# Patient Record
Sex: FEMALE | Race: BLACK OR AFRICAN AMERICAN | Employment: FULL TIME | URBAN - METROPOLITAN AREA
[De-identification: names, ages, dates, MRNs, and addresses within clinical notes are randomized per-mention and may not be internally consistent; named-entity substitution may affect disease eponyms.]

---

## 2018-08-05 ENCOUNTER — HOSPITAL ENCOUNTER (EMERGENCY)
Age: 28
Discharge: HOME | End: 2018-08-05
Attending: EMERGENCY MEDICINE
Payer: COMMERCIAL

## 2018-08-05 ENCOUNTER — APPOINTMENT (OUTPATIENT)
Dept: GENERAL RADIOLOGY | Age: 28
End: 2018-08-05
Payer: COMMERCIAL

## 2018-08-05 VITALS
HEART RATE: 81 BPM | DIASTOLIC BLOOD PRESSURE: 59 MMHG | BODY MASS INDEX: 37.21 KG/M2 | HEIGHT: 63 IN | RESPIRATION RATE: 18 BRPM | OXYGEN SATURATION: 100 % | WEIGHT: 210 LBS | SYSTOLIC BLOOD PRESSURE: 113 MMHG | TEMPERATURE: 98 F

## 2018-08-05 DIAGNOSIS — M25.562 ACUTE PAIN OF LEFT KNEE: ICD-10-CM

## 2018-08-05 DIAGNOSIS — M79.605 LEFT LEG PAIN: Primary | ICD-10-CM

## 2018-08-05 PROCEDURE — 99283 EMERGENCY DEPT VISIT LOW MDM: CPT

## 2018-08-05 PROCEDURE — 73564 X-RAY EXAM KNEE 4 OR MORE: CPT

## 2018-08-05 RX ORDER — IBUPROFEN 600 MG/1
600 TABLET ORAL EVERY 6 HOURS PRN
Qty: 40 TABLET | Refills: 0 | Status: SHIPPED | OUTPATIENT
Start: 2018-08-05

## 2018-08-05 ASSESSMENT — PAIN DESCRIPTION - LOCATION: LOCATION: KNEE;LEG

## 2018-08-05 ASSESSMENT — PAIN SCALES - GENERAL: PAINLEVEL_OUTOF10: 10

## 2018-08-05 ASSESSMENT — PAIN DESCRIPTION - ORIENTATION: ORIENTATION: LEFT;LOWER;POSTERIOR

## 2018-08-05 ASSESSMENT — PAIN DESCRIPTION - DESCRIPTORS: DESCRIPTORS: ACHING;THROBBING;SHARP;STABBING

## 2018-08-05 NOTE — ED PROVIDER NOTES
Mild distress   Head: Normocephalic  Eyes: No scleral icterus  ENT: No dry mucus membranes  Neck: Supple  Lungs: Clear to ascultation bilaterally, no wheeze, no rales, no rhonchi  Cardiac: Regular rate and rhythm, S1/S2, no murmurs  Abdominal: Soft, nondistended, nontender, no rebound, no guarding. Extremities: Left knee tenderness to palpation along the lateral aspects, calf abrasions to the left lateral   Rectal: Deferred  Genitourinary: Deferred  Skin: No rash  Neuro: AOx4, no decreased LOC  Psych: No anxiety  Perfusion: Warm x 4      MEDICAL DECISION MAKING:   [facility]  X-ray left knee shows no acute processes per the radiologist.  Patient was placed in a knee immobilizer and crutches. I reevaluated him after the immobilizer was placed and it is neurovascularly intact. Patient was given a prescription for Vicoprofen. Patient will follow up with orthopedics. Procedures    DIAGNOSTIC RESULTS   EKG: All EKG's are interpreted by the Emergency Department Physician who either signs or Co-signs this chart in the absence of a cardiologist.      RADIOLOGY:All plain film, CT, MRI, and formal ultrasound images (except ED bedside ultrasound) are read by the radiologist, see reports below, unless otherwise noted in [facility] or here. XR KNEE LEFT (MIN 4 VIEWS)   Final Result   Negative left knee radiographs. LABS: All lab results were reviewed by myself, and all abnormals are listed below.   Labs Reviewed - No data to display  EMERGENCY DEPARTMENT COURSE:   Vitals:    Vitals:    08/05/18 1426   BP: (!) 113/59   Pulse: 81   Resp: 18   Temp: 98 °F (36.7 °C)   TempSrc: Oral   SpO2: 100%   Weight: 210 lb (95.3 kg)   Height: 5' 3\" (1.6 m)       The patient was given the following medications while in the emergency department:  Orders Placed This Encounter   Medications    ibuprofen (IBU) 600 MG tablet     Sig: Take 1 tablet by mouth every 6 hours as needed for Pain     Dispense:  40 tablet     Refill:  0

## 2018-08-07 ENCOUNTER — OFFICE VISIT (OUTPATIENT)
Dept: ORTHOPEDIC SURGERY | Age: 28
End: 2018-08-07
Payer: COMMERCIAL

## 2018-08-07 VITALS — WEIGHT: 210.1 LBS | BODY MASS INDEX: 37.23 KG/M2 | HEIGHT: 63 IN

## 2018-08-07 DIAGNOSIS — S83.005A CLOSED DISLOCATION OF LEFT PATELLA, INITIAL ENCOUNTER: Primary | ICD-10-CM

## 2018-08-07 PROCEDURE — G8417 CALC BMI ABV UP PARAM F/U: HCPCS | Performed by: ORTHOPAEDIC SURGERY

## 2018-08-07 PROCEDURE — G8427 DOCREV CUR MEDS BY ELIG CLIN: HCPCS | Performed by: ORTHOPAEDIC SURGERY

## 2018-08-07 PROCEDURE — 99203 OFFICE O/P NEW LOW 30 MIN: CPT | Performed by: ORTHOPAEDIC SURGERY

## 2018-08-07 PROCEDURE — 1036F TOBACCO NON-USER: CPT | Performed by: ORTHOPAEDIC SURGERY

## 2018-08-07 NOTE — LETTER
[address] [place] [address] Rd  [address] [address]  Phone: [phone]  Fax: [phone]    Trevor Ann MD        August 7, 2018     Patient: Lake Saeed   YOB: 1990   Date of Visit: 8/7/2018       To Whom It May Concern: It is my medical opinion that Lake Saeed is able to return to work on 08/13/2018 with no restrictions. If you have any questions or concerns, please don't hesitate to call.     Sincerely,        Trevor Ann MD

## 2018-08-21 ENCOUNTER — OFFICE VISIT (OUTPATIENT)
Dept: ORTHOPEDIC SURGERY | Age: 28
End: 2018-08-21
Payer: COMMERCIAL

## 2018-08-21 VITALS — WEIGHT: 210 LBS | BODY MASS INDEX: 37.21 KG/M2 | HEIGHT: 63 IN

## 2018-08-21 DIAGNOSIS — S83.005A CLOSED DISLOCATION OF LEFT PATELLA, INITIAL ENCOUNTER: Primary | ICD-10-CM

## 2018-08-21 PROCEDURE — G8417 CALC BMI ABV UP PARAM F/U: HCPCS | Performed by: ORTHOPAEDIC SURGERY

## 2018-08-21 PROCEDURE — G8427 DOCREV CUR MEDS BY ELIG CLIN: HCPCS | Performed by: ORTHOPAEDIC SURGERY

## 2018-08-21 PROCEDURE — 1036F TOBACCO NON-USER: CPT | Performed by: ORTHOPAEDIC SURGERY

## 2018-08-21 PROCEDURE — 99212 OFFICE O/P EST SF 10 MIN: CPT | Performed by: ORTHOPAEDIC SURGERY

## 2018-08-21 NOTE — PROGRESS NOTES
This patient who is sustained a acute dislocation of the left patella is seen here in follow-up. She says that she is doing quite well. She has had occasional pain in the knee. The knee has not given out on her. She does wear a brace at work but she did not have one today. Examination: She does have bilateral knee hyperextension. She has full flexion bilaterally. She has hypermobile patella bilaterally. There was no effusion in the knee joint. Her apprehension test today was negative. Diagnosis: Status post acute dislocation left patella. Treatment: I asked her to continue with the quadriceps tendon any exercises. She will also wear a brace when at work. I will see her again as necessary.